# Patient Record
Sex: MALE | URBAN - METROPOLITAN AREA
[De-identification: names, ages, dates, MRNs, and addresses within clinical notes are randomized per-mention and may not be internally consistent; named-entity substitution may affect disease eponyms.]

---

## 2024-07-12 ENCOUNTER — APPOINTMENT (RX ONLY)
Dept: URBAN - METROPOLITAN AREA CLINIC 15 | Facility: CLINIC | Age: 44
Setting detail: DERMATOLOGY
End: 2024-07-12

## 2024-07-12 VITALS — WEIGHT: 175 LBS | HEIGHT: 68 IN

## 2024-07-12 DIAGNOSIS — B35.0 TINEA BARBAE AND TINEA CAPITIS: ICD-10-CM

## 2024-07-12 PROCEDURE — ? COUNSELING

## 2024-07-12 PROCEDURE — ? PRESCRIPTION

## 2024-07-12 PROCEDURE — 99203 OFFICE O/P NEW LOW 30 MIN: CPT

## 2024-07-12 PROCEDURE — ? PRESCRIPTION MEDICATION MANAGEMENT

## 2024-07-12 RX ORDER — FLUCONAZOLE 200 MG/1
1 TABLET ORAL QD
Qty: 45 | Refills: 0 | Status: ERX | COMMUNITY
Start: 2024-07-12

## 2024-07-12 RX ORDER — SILVER SULFADIAZINE 10 MG/G
1 CREAM TOPICAL QHS
Qty: 50 | Refills: 0 | Status: CANCELLED | COMMUNITY
Start: 2024-07-12

## 2024-07-12 RX ORDER — SELENIUM SULFIDE 23 MG/ML
1 SHAMPOO TOPICAL
Qty: 180 | Refills: 1 | Status: ERX | COMMUNITY
Start: 2024-07-12

## 2024-07-12 RX ADMIN — SILVER SULFADIAZINE 1: 10 CREAM TOPICAL at 00:00

## 2024-07-12 RX ADMIN — FLUCONAZOLE 1: 200 TABLET ORAL at 00:00

## 2024-07-12 RX ADMIN — SELENIUM SULFIDE 1: 23 SHAMPOO TOPICAL at 00:00

## 2024-07-12 ASSESSMENT — LOCATION ZONE DERM: LOCATION ZONE: SCALP

## 2024-07-12 ASSESSMENT — LOCATION SIMPLE DESCRIPTION DERM: LOCATION SIMPLE: POSTERIOR SCALP

## 2024-07-12 ASSESSMENT — LOCATION DETAILED DESCRIPTION DERM: LOCATION DETAILED: MID-OCCIPITAL SCALP

## 2024-07-12 ASSESSMENT — SEVERITY ASSESSMENT: SEVERITY: MILD TO MODERATE

## 2024-07-12 NOTE — HPI: RASH
What Type Of Note Output Would You Prefer (Optional)?: Bullet Format
How Severe Is Your Rash?: moderate
Is This A New Presentation, Or A Follow-Up?: Rash
Additional History: Patient was prescribed the Clotrimazole Betamethasone lotion by a friend . he sent him pictures and Diagnosed with ring worm. Patient has been using the treatment for about 11 days

## 2024-07-12 NOTE — PROCEDURE: PRESCRIPTION MEDICATION MANAGEMENT
Render In Strict Bullet Format?: No
Detail Level: Zone
Initiate Treatment: .\\n\\n- fluconazole 200 mg tablet: Take one tablet by mouth once daily for 6 weeks.\\n- selenium sulfide 2.3 % shampoo: Wash affected areas of the scalp and arm every other day when showering. Let sit for a few min before washing off.\\n\\n\\n- stop use of clotrimazole-betamethasone lotion (pt has rx) once finished 6 week oral fluconazole course\\n\\n.

## 2024-08-23 ENCOUNTER — APPOINTMENT (RX ONLY)
Dept: URBAN - METROPOLITAN AREA CLINIC 15 | Facility: CLINIC | Age: 44
Setting detail: DERMATOLOGY
End: 2024-08-23

## 2024-08-23 VITALS — HEIGHT: 68 IN | WEIGHT: 177 LBS

## 2024-08-23 DIAGNOSIS — B35.0 TINEA BARBAE AND TINEA CAPITIS: ICD-10-CM | Status: RESOLVING

## 2024-08-23 PROCEDURE — ? PRESCRIPTION

## 2024-08-23 PROCEDURE — ? COUNSELING

## 2024-08-23 PROCEDURE — 99213 OFFICE O/P EST LOW 20 MIN: CPT

## 2024-08-23 PROCEDURE — ? PRESCRIPTION MEDICATION MANAGEMENT

## 2024-08-23 RX ORDER — KETOCONAZOLE 20 MG/ML
1 SHAMPOO, SUSPENSION TOPICAL QOD
Qty: 120 | Refills: 11 | Status: ERX | COMMUNITY
Start: 2024-08-23

## 2024-08-23 RX ORDER — FLUCONAZOLE 200 MG/1
1 TABLET ORAL QD
Qty: 30 | Refills: 0 | Status: ERX

## 2024-08-23 RX ADMIN — KETOCONAZOLE 1: 20 SHAMPOO, SUSPENSION TOPICAL at 00:00

## 2024-08-23 ASSESSMENT — LOCATION ZONE DERM: LOCATION ZONE: SCALP

## 2024-08-23 ASSESSMENT — LOCATION DETAILED DESCRIPTION DERM: LOCATION DETAILED: MID-OCCIPITAL SCALP

## 2024-08-23 ASSESSMENT — LOCATION SIMPLE DESCRIPTION DERM: LOCATION SIMPLE: POSTERIOR SCALP

## 2024-08-23 NOTE — PROCEDURE: PRESCRIPTION MEDICATION MANAGEMENT
Render In Strict Bullet Format?: No
Detail Level: Zone
Modify Regimen: .\\n\\nORAL- RESERVE FOR FLARES:\\n\\n-fluconazole 200 mg tablet: Take one tablet by mouth once daily for one month.\\n\\n………………………………………………………………………………………………………………………………\\n\\nTOPICAL- USE FOR 2 MONTHS, THEN USE AS MAINTENANCE/PREVENTION:\\n\\n-ketoconazole 2 % shampoo: Wash scalp and arms every other day, as needed for flares. Apply, allow to sit for 5-10 minutes, then rinse thoroughly.\\n\\n.
Discontinue Regimen: .\\n\\n- selenium sulfide 2.3 % shampoo\\n- clotrimazole-betamethasone lotion\\n\\n.